# Patient Record
Sex: FEMALE | Race: WHITE
[De-identification: names, ages, dates, MRNs, and addresses within clinical notes are randomized per-mention and may not be internally consistent; named-entity substitution may affect disease eponyms.]

---

## 2021-09-20 ENCOUNTER — HOSPITAL ENCOUNTER (OUTPATIENT)
Dept: HOSPITAL 95 - LAB | Age: 51
Discharge: HOME | End: 2021-09-20
Attending: OBSTETRICS & GYNECOLOGY
Payer: COMMERCIAL

## 2021-09-20 DIAGNOSIS — Z01.419: Primary | ICD-10-CM

## 2021-09-20 PROCEDURE — G0123 SCREEN CERV/VAG THIN LAYER: HCPCS

## 2021-09-21 LAB — OTHER STN SPEC: (no result)

## 2021-10-07 ENCOUNTER — HOSPITAL ENCOUNTER (OUTPATIENT)
Dept: HOSPITAL 95 - LAB SHORT | Age: 51
End: 2021-10-07
Attending: OBSTETRICS & GYNECOLOGY
Payer: COMMERCIAL

## 2021-10-07 DIAGNOSIS — N92.6: Primary | ICD-10-CM

## 2021-10-07 DIAGNOSIS — Z88.8: ICD-10-CM

## 2021-10-07 DIAGNOSIS — D49.59: ICD-10-CM

## 2021-11-18 ENCOUNTER — HOSPITAL ENCOUNTER (OUTPATIENT)
Dept: HOSPITAL 95 - LAB | Age: 51
Discharge: HOME | End: 2021-11-18
Attending: OBSTETRICS & GYNECOLOGY
Payer: COMMERCIAL

## 2021-11-18 DIAGNOSIS — Z01.812: Primary | ICD-10-CM

## 2021-11-19 LAB
PROT UR STRIP-MCNC: (no result) MG/DL
RBC #/AREA URNS HPF: (no result) /HPF (ref 0–2)
SP GR SPEC: 1.02 (ref 1–1.02)
UROBILINOGEN UR STRIP-MCNC: (no result) MG/DL
WBC #/AREA URNS HPF: (no result) /HPF (ref 0–5)

## 2021-11-22 ENCOUNTER — HOSPITAL ENCOUNTER (OUTPATIENT)
Dept: HOSPITAL 95 - ORSCMMR | Age: 51
LOS: 1 days | Discharge: HOME | End: 2021-11-23
Attending: OBSTETRICS & GYNECOLOGY
Payer: COMMERCIAL

## 2021-11-22 VITALS — BODY MASS INDEX: 33.3 KG/M2 | HEIGHT: 61 IN | WEIGHT: 176.37 LBS

## 2021-11-22 DIAGNOSIS — N92.0: ICD-10-CM

## 2021-11-22 DIAGNOSIS — N80.0: ICD-10-CM

## 2021-11-22 DIAGNOSIS — Z79.82: ICD-10-CM

## 2021-11-22 DIAGNOSIS — E66.9: ICD-10-CM

## 2021-11-22 DIAGNOSIS — N83.291: ICD-10-CM

## 2021-11-22 DIAGNOSIS — N81.9: Primary | ICD-10-CM

## 2021-11-22 DIAGNOSIS — Z79.899: ICD-10-CM

## 2021-11-22 DIAGNOSIS — K21.9: ICD-10-CM

## 2021-11-22 DIAGNOSIS — N81.5: ICD-10-CM

## 2021-11-22 DIAGNOSIS — I10: ICD-10-CM

## 2021-11-22 DIAGNOSIS — N93.9: ICD-10-CM

## 2021-11-22 PROCEDURE — 0JQC0ZZ REPAIR PELVIC REGION SUBCUTANEOUS TISSUE AND FASCIA, OPEN APPROACH: ICD-10-PCS | Performed by: OBSTETRICS & GYNECOLOGY

## 2021-11-22 PROCEDURE — 0UT9FZZ RESECTION OF UTERUS, VIA NATURAL OR ARTIFICIAL OPENING WITH PERCUTANEOUS ENDOSCOPIC ASSISTANCE: ICD-10-PCS | Performed by: OBSTETRICS & GYNECOLOGY

## 2021-11-22 PROCEDURE — 0UT7FZZ RESECTION OF BILATERAL FALLOPIAN TUBES, VIA NATURAL OR ARTIFICIAL OPENING WITH PERCUTANEOUS ENDOSCOPIC ASSISTANCE: ICD-10-PCS | Performed by: OBSTETRICS & GYNECOLOGY

## 2021-11-22 PROCEDURE — 0UQF7ZZ REPAIR CUL-DE-SAC, VIA NATURAL OR ARTIFICIAL OPENING: ICD-10-PCS | Performed by: OBSTETRICS & GYNECOLOGY

## 2021-11-22 PROCEDURE — A9270 NON-COVERED ITEM OR SERVICE: HCPCS

## 2021-11-22 NOTE — NUR
SUMMARY: NO ACUTE CHANGE SINCE POST OP. VSS. PT REPORTS
DISCOMFORT/PAIN/PRESSURE WHERE IVEY INSERTED AND NEAR PACKING. PER ORDER,
IVEY/PACKING TO BE REMOVED IN THE MORNING BY DR. MALLOY. PT MEDICATED PER EMAR
AND K PAD APPLIED. PT ABLE TO TAKE IN SOME FOOD TONIGHT, DENIES N/V. A/O.
WILL REPORT TO NOC RN

## 2021-11-22 NOTE — NUR
POST OP: PT TO UNIT AT ABOUT 1440, REPORT RECEIVED FROM NIRANJAN RICH RN. PT
IS A/O, VSS. MEDICATED FOR PAIN. SURGICAL SITES WNL, SCANT VAGINAL BLEED. WILL
CTM.

## 2021-11-23 LAB
BASOPHILS # BLD AUTO: 0.04 K/MM3 (ref 0–0.23)
BASOPHILS # BLD: 0 K/MM3 (ref 0–0.23)
BASOPHILS NFR BLD AUTO: 0 % (ref 0–2)
BASOPHILS NFR BLD: 0 % (ref 0–2)
DEPRECATED RDW RBC AUTO: 42.8 FL (ref 35.1–46.3)
EOSINOPHIL # BLD AUTO: 1.1 K/MM3 (ref 0–0.68)
EOSINOPHIL # BLD: 0 K/MM3 (ref 0–0.68)
EOSINOPHIL NFR BLD AUTO: 5 % (ref 0–6)
EOSINOPHIL NFR BLD: 0 % (ref 0–6)
ERYTHROCYTE [DISTWIDTH] IN BLOOD BY AUTOMATED COUNT: 13.1 % (ref 11.7–14.2)
HCT VFR BLD AUTO: 36 % (ref 33–51)
HGB BLD-MCNC: 12.3 G/DL (ref 11.5–16)
IMM GRANULOCYTES # BLD AUTO: 0.16 K/MM3 (ref 0–0.1)
IMM GRANULOCYTES NFR BLD AUTO: 1 % (ref 0–1)
LYMPHOCYTES # BLD AUTO: 1.07 K/MM3 (ref 0.84–5.2)
LYMPHOCYTES # BLD: 2.56 K/MM3 (ref 0.84–5.2)
LYMPHOCYTES NFR BLD AUTO: 5 % (ref 21–46)
LYMPHOCYTES NFR BLD: 12 % (ref 21–46)
MCHC RBC AUTO-ENTMCNC: 34.2 G/DL (ref 31.5–36.5)
MCV RBC AUTO: 89 FL (ref 80–100)
MONOCYTES # BLD AUTO: 1.02 K/MM3 (ref 0.16–1.47)
MONOCYTES # BLD: 1.07 K/MM3 (ref 0.16–1.47)
MONOCYTES NFR BLD AUTO: 5 % (ref 4–13)
MONOCYTES NFR BLD: 5 % (ref 4–13)
NEUTROPHILS # BLD AUTO: 18.02 K/MM3 (ref 1.96–9.15)
NEUTROPHILS NFR BLD AUTO: 84 % (ref 41–73)
NEUTS BAND NFR BLD MANUAL: 4 % (ref 0–8)
NEUTS SEG # BLD MANUAL: 17.77 K/MM3 (ref 1.96–9.15)
NEUTS SEG NFR BLD MANUAL: 79 % (ref 41–73)
NRBC # BLD AUTO: 0 K/MM3 (ref 0–0.02)
NRBC BLD AUTO-RTO: 0 /100 WBC (ref 0–0.2)
PLATELET # BLD AUTO: 247 K/MM3 (ref 150–400)
TOTAL CELLS COUNTED BLD: 100

## 2021-11-23 NOTE — NUR
POD 1 S/P LAVH. PT VSS T/O NIGHT. INCISIONS CDI. PT HAD SMALL AMT VAGINAL
BLEEDING. FOELY AND PACKING REMIAN IN PLACE PER ORDERS; AWAITING MD TO DC.
PAIN MGD PER EMAR, PT NEEDED IV PAIN MEDS FOR BREAKTHROUGH PAIN X2. PT ASHLEY REG
PO, IS PASSING FLATUS, NO N/V. IV SL THIS AM.

## 2021-11-23 NOTE — NUR
DISCHARGE: PACKET PRINTED AND PT EDUCATED. IV DCD WNL. PT GIVEN SCRIPT FOR
ROXICODONE THAT WAS FAXED FROM DR. MALLOY'S OFFICE. PT LEFT UNIT VIA WHEELCHAIR
WITH THIS RN AT 1220

## 2021-11-30 ENCOUNTER — HOSPITAL ENCOUNTER (OUTPATIENT)
Dept: HOSPITAL 95 - LAB SHORT | Age: 51
End: 2021-11-30
Attending: OBSTETRICS & GYNECOLOGY
Payer: COMMERCIAL

## 2021-11-30 DIAGNOSIS — Z48.89: Primary | ICD-10-CM

## 2021-12-16 ENCOUNTER — HOSPITAL ENCOUNTER (OUTPATIENT)
Dept: HOSPITAL 95 - LAB SHORT | Age: 51
End: 2021-12-16
Attending: OBSTETRICS & GYNECOLOGY
Payer: COMMERCIAL

## 2021-12-16 DIAGNOSIS — R10.2: Primary | ICD-10-CM

## 2021-12-16 LAB
LEUKOCYTE ESTERASE UR QL STRIP: (no result)
RBC #/AREA URNS HPF: (no result) /HPF (ref 0–2)
SP GR SPEC: 1.02 (ref 1–1.02)
UROBILINOGEN UR STRIP-MCNC: (no result) MG/DL
WBC #/AREA URNS HPF: (no result) /HPF (ref 0–5)

## 2022-01-20 ENCOUNTER — HOSPITAL ENCOUNTER (OUTPATIENT)
Dept: HOSPITAL 95 - LAB SHORT | Age: 52
Discharge: HOME | End: 2022-01-20
Attending: OBSTETRICS & GYNECOLOGY
Payer: COMMERCIAL

## 2022-01-20 DIAGNOSIS — G89.18: Primary | ICD-10-CM

## 2022-05-10 ENCOUNTER — HOSPITAL ENCOUNTER (OUTPATIENT)
Dept: HOSPITAL 95 - ORSCSDS | Age: 52
Discharge: HOME | End: 2022-05-10
Attending: ORTHOPAEDIC SURGERY
Payer: COMMERCIAL

## 2022-05-10 VITALS — WEIGHT: 176.81 LBS | HEIGHT: 61 IN | BODY MASS INDEX: 33.38 KG/M2

## 2022-05-10 DIAGNOSIS — Z79.82: ICD-10-CM

## 2022-05-10 DIAGNOSIS — Z79.899: ICD-10-CM

## 2022-05-10 DIAGNOSIS — S46.002A: Primary | ICD-10-CM

## 2022-05-10 DIAGNOSIS — K21.9: ICD-10-CM

## 2022-05-10 DIAGNOSIS — M75.42: ICD-10-CM

## 2022-05-10 DIAGNOSIS — I10: ICD-10-CM

## 2022-05-10 DIAGNOSIS — M75.22: ICD-10-CM

## 2022-05-10 PROCEDURE — 0LQ24ZZ REPAIR LEFT SHOULDER TENDON, PERCUTANEOUS ENDOSCOPIC APPROACH: ICD-10-PCS | Performed by: ORTHOPAEDIC SURGERY

## 2022-05-10 PROCEDURE — 0LS44ZZ REPOSITION LEFT UPPER ARM TENDON, PERCUTANEOUS ENDOSCOPIC APPROACH: ICD-10-PCS | Performed by: ORTHOPAEDIC SURGERY

## 2022-05-10 PROCEDURE — C1713 ANCHOR/SCREW BN/BN,TIS/BN: HCPCS

## 2022-05-10 PROCEDURE — 0RNK4ZZ RELEASE LEFT SHOULDER JOINT, PERCUTANEOUS ENDOSCOPIC APPROACH: ICD-10-PCS | Performed by: ORTHOPAEDIC SURGERY

## 2022-05-10 PROCEDURE — 0LU24JZ SUPPLEMENT LEFT SHOULDER TENDON WITH SYNTHETIC SUBSTITUTE, PERCUTANEOUS ENDOSCOPIC APPROACH: ICD-10-PCS | Performed by: ORTHOPAEDIC SURGERY

## 2022-05-10 NOTE — NUR
05/10/22 1435 DEBORAH WRIGHT
PT HAS COUGH- NEW POST PROCEDURE, RN EXPLAINED TO PT IT COULD BE DUE
TO INTUBATION- TO LET DR ANAYA KNOW IF IT PERSISTS FOR FEW DAYS. PT
DENIES PAIN OR NAUSEA. UP TO BATHROOM WITH STAFF ASSIST POST
PROCEDURE, TOLERATED GETTING UP WELL. PT DC VIA WHEELCHAIR WITH STAFF
ASSIST TO GO HOME WITH RIDE [employer] FRIEND

## 2023-03-27 ENCOUNTER — HOSPITAL ENCOUNTER (EMERGENCY)
Dept: HOSPITAL 95 - ER | Age: 53
Discharge: HOME | End: 2023-03-27
Payer: COMMERCIAL

## 2023-03-27 VITALS — WEIGHT: 159.99 LBS | BODY MASS INDEX: 31.41 KG/M2 | HEIGHT: 60 IN

## 2023-03-27 DIAGNOSIS — Z88.8: ICD-10-CM

## 2023-03-27 DIAGNOSIS — I10: ICD-10-CM

## 2023-03-27 DIAGNOSIS — K21.9: ICD-10-CM

## 2023-03-27 DIAGNOSIS — M54.2: Primary | ICD-10-CM

## 2023-03-27 DIAGNOSIS — Z79.82: ICD-10-CM
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
## 2023-03-27 DIAGNOSIS — Z79.899: ICD-10-CM

## 2023-03-27 PROCEDURE — A9270 NON-COVERED ITEM OR SERVICE: HCPCS
